# Patient Record
Sex: FEMALE
[De-identification: names, ages, dates, MRNs, and addresses within clinical notes are randomized per-mention and may not be internally consistent; named-entity substitution may affect disease eponyms.]

---

## 2018-02-21 ENCOUNTER — HOSPITAL ENCOUNTER (EMERGENCY)
Dept: HOSPITAL 14 - H.ER | Age: 53
Discharge: HOME | End: 2018-02-21
Payer: COMMERCIAL

## 2018-02-21 VITALS — RESPIRATION RATE: 16 BRPM

## 2018-02-21 VITALS — OXYGEN SATURATION: 100 %

## 2018-02-21 VITALS — SYSTOLIC BLOOD PRESSURE: 117 MMHG | TEMPERATURE: 99.7 F | DIASTOLIC BLOOD PRESSURE: 70 MMHG | HEART RATE: 101 BPM

## 2018-02-21 DIAGNOSIS — J11.1: Primary | ICD-10-CM

## 2018-02-21 NOTE — ED PDOC
HPI: CCC, URI, Sore Throat


Time Seen by Provider: 02/21/18 20:20


Chief Complaint (Nursing): Flu-like Symptoms


Chief Complaint (Provider): fever


History Per: Patient, Family


History/Exam Limitations: no limitations


Onset/Duration Of Symptoms: Days (2)


Current Symptoms Are (Timing): Still Present


Associated Symptoms: Fever, Chills, Cough, Sputum, Myalgias, Nasal Congestion


Additional History Per: Patient


Additional Complaint(s): 





53 y/o female presents with fever x 2 days.  Associated headache, bodyaches, 

nasal congestion, productive cough.  PAtient has been taking over the counter 

sinus medication without improvement.  Denies nausea/vomiting, chest pain, 

shortness of breath, palpitations, abdominal pain, changes in bowel movements, 

urinary symptoms, recent travel, sick contacts.  





Past Medical History


Reviewed: Historical Data, Nursing Documentation, Vital Signs


Vital Signs: 


 Last Vital Signs











Temp  99.7 F H  02/21/18 22:50


 


Pulse  101 H  02/21/18 22:50


 


Resp  16   02/21/18 22:50


 


BP  117/70   02/21/18 22:50


 


Pulse Ox  95   02/21/18 22:50














- Medical History


PMH: No Chronic Diseases





- Surgical History


Surgical History: No Surg Hx





- Family History


Family History: States: No Known Family Hx





- Home Medications


Home Medications: 


 Ambulatory Orders











 Medication  Instructions  Recorded


 


Fluticasone Nasal [Flonase] 1 actuation NS BID #1 bottle 02/21/18


 


Ibuprofen [Motrin Tab] 1 tab PO Q6 PRN #20 tab 02/21/18


 


Oseltamivir [Tamiflu] 75 mg PO BID #9 cap 02/21/18


 


Promethazine DM [Phenergan DM 5 ml PO Q6 PRN #1 bottle 02/21/18





[place]]  














- Allergies


Allergies/Adverse Reactions: 


 Allergies











Allergy/AdvReac Type Severity Reaction Status Date / Time


 


No Known Allergies Allergy   Verified 02/21/18 20:12














Review of Systems


ROS Statement: Except As Marked, All Systems Reviewed And Found Negative


Constitutional: Positive for: Fever, Chills


ENT: Positive for: Nose Congestion


Respiratory: Positive for: Cough





Physical Exam





- Reviewed


Nursing Documentation Reviewed: Yes


Vital Signs Reviewed: Yes





- Physical Exam


Appears: Positive for: Well, Non-toxic, No Acute Distress


Head Exam: Positive for: ATRAUMATIC, NORMAL INSPECTION, NORMOCEPHALIC


Skin: Positive for: Normal Color


Eye Exam: Positive for: Normal appearance


ENT: Positive for: Nasal Congestion


Cardiovascular/Chest: Positive for: Regular Rate, Rhythm


Respiratory: Positive for: Normal Breath Sounds


Gastrointestinal/Abdominal: Positive for: Normal Exam


Back: Positive for: Normal Inspection


Extremity: Positive for: Normal ROM


Neurologic/Psych: Positive for: Alert, Oriented





- ECG


O2 Sat by Pulse Oximetry: 100





- Radiology


X-Ray: Viewed By Me


X-Ray Interpretation: No Acute Disease





- Progress


ED Course And Treament: 





chest xray, ibuprofen





Patient educated on findings, discharged with rx Tamiflu (dose given in ED), 

flonase, promethazine DM, ibuprofen.


Advised fluids, rest.


Follow up PMD 2-3 days.


Return precautions given.








Disposition





- Clinical Impression


Clinical Impression: 


 Influenza-like illness








- Patient ED Disposition


Is Patient to be Admitted: No


Counseled Patient/Family Regarding: Studies Performed, Diagnosis, Need For 

Followup, Rx Given





- Disposition


Referrals: 


[facility] [Outside]


Disposition: Routine/Home


Disposition Time: 23:09


Condition: IMPROVED


Prescriptions: 


Fluticasone Nasal [Flonase] 1 actuation NS BID #1 bottle


Ibuprofen [Motrin Tab] 1 tab PO Q6 PRN #20 tab


 PRN Reason: Fever >100.4 F


Oseltamivir [Tamiflu] 75 mg PO BID #9 cap


Promethazine DM [Phenergan DM Syrup] 5 ml PO Q6 PRN #1 bottle


 PRN Reason: Cough


Instructions:  Flu


Forms:  [employer] (Slovenian)


Print Language: Czech

## 2018-02-22 NOTE — RAD
HISTORY:

fever, cough  



COMPARISON:

No prior.



TECHNIQUE:

Chest PA and lateral



FINDINGS:



LUNGS:

No active pulmonary disease.



PLEURA:

No significant pleural effusion identified. No pneumothorax apparent.



CARDIOVASCULAR:

Normal.



OSSEOUS STRUCTURES:

Mild degenerative changes.



VISUALIZED UPPER ABDOMEN:

Normal.



OTHER FINDINGS:

None.



IMPRESSION:

No active disease.

## 2018-07-03 ENCOUNTER — HOSPITAL ENCOUNTER (EMERGENCY)
Dept: HOSPITAL 14 - H.ER | Age: 53
Discharge: HOME | End: 2018-07-03
Payer: SELF-PAY

## 2018-07-03 VITALS — TEMPERATURE: 98.1 F

## 2018-07-03 VITALS
RESPIRATION RATE: 18 BRPM | HEART RATE: 70 BPM | OXYGEN SATURATION: 98 % | DIASTOLIC BLOOD PRESSURE: 76 MMHG | SYSTOLIC BLOOD PRESSURE: 123 MMHG

## 2018-07-03 VITALS — BODY MASS INDEX: 31.6 KG/M2

## 2018-07-03 DIAGNOSIS — R51: Primary | ICD-10-CM

## 2018-07-03 NOTE — CT
PROCEDURE:  CT HEAD WITHOUT CONTRAST.



HISTORY:

headache



COMPARISON:

None available. 



TECHNIQUE:

Axial computed tomography images were obtained through the head/brain 

without intravenous contrast.  



Radiation dose:



Total exam DLP = 801 mGy-cm.



This CT exam was performed using one or more of the following dose 

reduction techniques: Automated exposure control, adjustment of the 

mA and/or kV according to patient size, and/or use of iterative 

reconstruction technique.



FINDINGS:



HEMORRHAGE:

No intracranial hemorrhage. 



BRAIN:

No mass effect or edema.  No atrophy or chronic microvascular 

ischemic changes.



VENTRICLES:

Unremarkable. No hydrocephalus. 



CALVARIUM:

Unremarkable.



PARANASAL SINUSES:

Unremarkable as visualized. No significant inflammatory changes.



MASTOID AIR CELLS:

Unremarkable as visualized. No inflammatory changes.



OTHER FINDINGS:

None.



IMPRESSION:

Normal CT of the Head.

## 2018-07-03 NOTE — ED PDOC
HPI:  Headache


Time Seen by Provider: 07/03/18 11:12


Chief Complaint (Nursing): Headache


History Per: Patient


Onset/Duration Of Symptoms: Days (7)


Current Symptoms Are (Timing): Still Present


Severity: Moderate


Quality: Aching


Preceeding Symptoms: None


Associated Symptoms: denies: Photophobia, Blurred Vision, Nausea, Vomiting, 

Extremity Weakness


Additional Complaint(s): 





Left sided headache x 1 week. Denies fever or injury. Denies dizziness. No NV. 

No weakness or parasthesias.





Past Medical History


Vital Signs: 





 Last Vital Signs











Temp  98.1 F   07/03/18 11:06


 


Pulse  64   07/03/18 11:06


 


Resp  20   07/03/18 11:06


 


BP  119/73   07/03/18 11:06


 


Pulse Ox  97   07/03/18 11:06














- Medical History


PMH: No Chronic Diseases





- Family History


Family History: States: Unknown Family Hx





- Home Medications


Home Medications: 


 Ambulatory Orders











 Medication  Instructions  Recorded


 


Naproxen [Naprosyn] 500 mg PO Q12H #20 tab 07/03/18














- Allergies


Allergies/Adverse Reactions: 


 Allergies











Allergy/AdvReac Type Severity Reaction Status Date / Time


 


No Known Allergies Allergy   Verified 07/03/18 11:26














Review of Systems


ROS Statement: Except As Marked, All Systems Reviewed And Found Negative


Neurological: Positive for: Headache





Physical Exam





- Reviewed


Nursing Documentation Reviewed: Yes


Vital Signs Reviewed: Yes





- Physical Exam


Appears: Positive for: Well, Non-toxic, No Acute Distress


Head Exam: Positive for: ATRAUMATIC, NORMAL INSPECTION, NORMOCEPHALIC


Skin: Positive for: Normal Color, Warm, DRY


Eye Exam: Positive for: EOMI, Normal appearance, PERRL


ENT: Positive for: Normal ENT Inspection


Neck: Positive for: Normal, Painless ROM, Supple


Cardiovascular/Chest: Positive for: Regular Rate, Rhythm


Respiratory: Positive for: CNT, Normal Breath Sounds


Gastrointestinal/Abdominal: Positive for: Normal Exam, Soft


Back: Positive for: Normal Inspection


Extremity: Positive for: Normal ROM


Neurologic/Psych: Positive for: Alert, Oriented.  Negative for: Motor/Sensory 

Deficits





- ECG


O2 Sat by Pulse Oximetry: 97





Disposition





- Clinical Impression


Clinical Impression: 


 Headache








- Patient ED Disposition


Is Patient to be Admitted: No


Counseled Patient/Family Regarding: Studies Performed, Diagnosis, Need For 

Followup, Rx Given





- Disposition


Referrals: 


Joe Jarrell MD [Medical Doctor] - 


Disposition: Routine/Home


Disposition Time: 13:15


Condition: FAIR


Prescriptions: 


Naproxen [Naprosyn] 500 mg PO Q12H #20 tab


Instructions:  Headache, Adult


Forms:  [facility] (English)


Print Language: Frisian

## 2019-04-21 ENCOUNTER — HOSPITAL ENCOUNTER (EMERGENCY)
Dept: HOSPITAL 14 - H.ER | Age: 54
Discharge: HOME | End: 2019-04-21
Payer: SELF-PAY

## 2019-04-21 VITALS
OXYGEN SATURATION: 97 % | DIASTOLIC BLOOD PRESSURE: 79 MMHG | SYSTOLIC BLOOD PRESSURE: 148 MMHG | HEART RATE: 73 BPM | RESPIRATION RATE: 18 BRPM | TEMPERATURE: 99.5 F

## 2019-04-21 VITALS — BODY MASS INDEX: 29.5 KG/M2

## 2019-04-21 DIAGNOSIS — M54.32: Primary | ICD-10-CM

## 2019-04-21 PROCEDURE — 96372 THER/PROPH/DIAG INJ SC/IM: CPT

## 2019-04-21 PROCEDURE — 99283 EMERGENCY DEPT VISIT LOW MDM: CPT

## 2019-04-21 PROCEDURE — 81025 URINE PREGNANCY TEST: CPT

## 2019-04-21 NOTE — ED PDOC
HPI: General Adult


Time Seen by Provider: 04/21/19 21:55


Chief Complaint (Nursing): Back Pain


Chief Complaint (Provider): BACK PAIN


History Per: Patient (52 Y/O FEMALE H/O HERNIATED DISC HERE WITH LEFT HIP PAIN 

RADIATING TO LEG. HAS TRIED ALLEVE INTERMITTENTLY WITHOUT RELIEF.  NO URINARY OR

RECTAL INCONTINENCE.  LAST ATTEMPT AT ALLEVE THIS MORNING.)





Past Medical History


Reviewed: Historical Data, Nursing Documentation, Vital Signs


Vital Signs: 





                                Last Vital Signs











Temp  99.5 F   04/21/19 21:39


 


Pulse  73   04/21/19 21:39


 


Resp  18   04/21/19 21:39


 


BP  148/79   04/21/19 21:39


 


Pulse Ox  97   04/21/19 21:39














- Family History


Family History: States: Unknown Family Hx





- Home Medications


Home Medications: 


                                Ambulatory Orders











 Medication  Instructions  Recorded


 


Naproxen [Naprosyn] 500 mg PO Q12H #20 tab 07/03/18


 


Naproxen 375 mg PO Q8 PRN #21 tablet 04/21/19


 


diaZEpam [Valium] 5 mg PO Q8 PRN #3 tab 04/21/19














- Allergies


Allergies/Adverse Reactions: 


                                    Allergies











Allergy/AdvReac Type Severity Reaction Status Date / Time


 


No Known Allergies Allergy   Verified 04/21/19 21:38














Review of Systems


ROS Statement: Except As Marked, All Systems Reviewed And Found Negative





Physical Exam





- Reviewed


Nursing Documentation Reviewed: Yes


Vital Signs Reviewed: Yes





- Physical Exam


Appears: Positive for: Well, Non-toxic, No Acute Distress


Head Exam: Positive for: ATRAUMATIC, NORMAL INSPECTION, NORMOCEPHALIC


Skin: Positive for: Normal Color, Warm, DRY


Eye Exam: Positive for: EOMI, Normal appearance, PERRL


ENT: Positive for: Normal ENT Inspection


Neck: Positive for: Normal, Painless ROM


Cardiovascular/Chest: Positive for: Regular Rate, Rhythm


Respiratory: Positive for: CNT, Normal Breath Sounds


Gastrointestinal/Abdominal: Positive for: Normal Exam, Soft


Back: Positive for: Normal Inspection, Other (MILD ERYTHEMA IN LEFT LOWER BACK 

ASSOCIATED WITH CUPPING DONE BY RELATIVE TO ASSIST WITH PAIN)


Extremity: Positive for: Normal ROM, Tenderness (LEFT GLUTEAL REGION WITH 

TENDERNESS.)


Neurological/Psych: Positive for: Awake, Alert, Normal Tone





- ECG


O2 Sat by Pulse Oximetry: 97





Disposition





- Clinical Impression


Clinical Impression: 


 Sciatica








- Patient ED Disposition


Is Patient to be Admitted: No





- Disposition


Referrals: 


[facility] [Outside]


Disposition: Routine/Home


Disposition Time: 23:05


Condition: FAIR


Prescriptions: 


diaZEpam [Valium] 5 mg PO Q8 PRN #3 tab


 PRN Reason: Muscle Spasm


Naproxen 375 mg PO Q8 PRN #21 tablet


 PRN Reason: Pain, Moderate (4-7)


Instructions:  Sciatica (DC)


Forms:  [facility] ED School/Work Excuse


Print Language: Setswana